# Patient Record
(demographics unavailable — no encounter records)

---

## 2025-01-09 NOTE — HISTORY OF PRESENT ILLNESS
[FreeTextEntry1] : Mr. 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 To review, he had R superior hypophyseal artery aneurysm treated with coil embolization at outside institution in 2011, repeat angiogram showed significant residual aneurysm. He was also found to have L parietal meningioma s/p embolization and resection 4/14/22 and 4/15/22. 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 He underwent repeat MRI in January 2024 which shows small nodular enhancement at the periphery of the surgical site, may be confluence of venous structures or residual vs recurrent meningioma. He presents today for follow up of plan of both aneurysm and meningioma. He was planned for aneurysm embolization in August 2024 but patient then elected to not undergo aneurysm treatment. He presents today for follow up and discussion of aneurysm treatment. He reports that he feels well, denies headache, weakness, numbness, tingling, difficulty walking, difficulty speaking, dizziness.

## 2025-01-09 NOTE — ASSESSMENT
[FreeTextEntry1] : 78M with PMH R superior hypophyseal aneurysm s/p coil embo 2011 with residual, L frontal meningioma s/p embo and resection 4/2022 who presents today for follow up.  Plan: - Cerebral angiogram for aneurysm embolization with stenting, tentatively scheduled for Tuesday, February 11, 2025 - Continue aspirin 325 mg daily, start plavix 75 mg daily 1 week prior to procedure - PST, medical clearance prior to procedure - Patient and family in agreement with plan.

## 2025-06-19 NOTE — HISTORY OF PRESENT ILLNESS
[FreeTextEntry1] : Mr. 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 To review, he had R superior hypophyseal artery aneurysm treated with coil embolization at outside institution in 2011, repeat angiogram showed significant residual aneurysm. He was also found to have L parietal meningioma s/p embolization and resection 4/14/22 and 4/15/22. 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 He underwent repeat MRI in January 2024 which shows small nodular enhancement at the periphery of the surgical site, may be confluence of venous structures or residual vs recurrent meningioma. He presents today for follow up of plan of both aneurysm and meningioma. He was planned for aneurysm embolization in August 2024 but patient then elected to not undergo aneurysm treatment. He underwent R superior hypophyseal aneurysm embolization with pipeline stent x1 on 2/11/25. He underwent 3-month MRA NOVA which showed good flow through the stent. He presents today for follow up of results. He continues on aspirin and plavix. Strata valve reset to 2.5 post MRI.

## 2025-06-19 NOTE — REASON FOR VISIT
[Follow-Up: _____] : a [unfilled] follow-up visit [Spouse] : spouse [Family Member] : family member [Language Line ] : provided by [employer]   [Interpreters_IDNumber] : [id number] [Interpreters_FullName] : Eliot [[id number]] : Chinese

## 2025-06-19 NOTE — ASSESSMENT
[FreeTextEntry1] : 78M with PMH R superior hypophyseal aneurysm s/p coil embo 2011 with residual, L frontal meningioma s/p embo and resection 4/2022 who underwent R superior hypophyseal aneurysm embolization with pipeline stenting on 2/11/25. 3-month MRA NOVA showed good flow through the stent.   Plan: - Continue aspirin 325 mg daily, start plavix 75 mg daily - 6-month angiogram tentatively planned for Tuesday, August 26, 2025 - PST prior to procedure  - Patient and family in agreement with plan.

## 2025-06-19 NOTE — END OF VISIT
[FreeTextEntry3] : Stable MRA NOVA.  Compliant on Aspirin Plavix.  Follow-up angiogram tentatively for Tuesday, August 26, 2025.  The risk, benefits and alternatives of cerebral angiography were discussed with the patient and his family and they requested to proceed. [Time Spent: ___ minutes] : I have spent [unfilled] minutes of time on the encounter which excludes teaching and separately reported services.